# Patient Record
Sex: MALE | Race: WHITE | NOT HISPANIC OR LATINO | Employment: FULL TIME | URBAN - METROPOLITAN AREA
[De-identification: names, ages, dates, MRNs, and addresses within clinical notes are randomized per-mention and may not be internally consistent; named-entity substitution may affect disease eponyms.]

---

## 2024-05-30 ENCOUNTER — OFFICE VISIT (OUTPATIENT)
Dept: PRIMARY CARE | Facility: CLINIC | Age: 24
End: 2024-05-30
Payer: COMMERCIAL

## 2024-05-30 VITALS
HEART RATE: 88 BPM | BODY MASS INDEX: 27.11 KG/M2 | SYSTOLIC BLOOD PRESSURE: 170 MMHG | OXYGEN SATURATION: 98 % | HEIGHT: 63 IN | DIASTOLIC BLOOD PRESSURE: 90 MMHG | WEIGHT: 153 LBS | TEMPERATURE: 98.2 F

## 2024-05-30 DIAGNOSIS — Z00.00 ROUTINE GENERAL MEDICAL EXAMINATION AT A HEALTH CARE FACILITY: Primary | ICD-10-CM

## 2024-05-30 DIAGNOSIS — I10 PRIMARY HYPERTENSION: ICD-10-CM

## 2024-05-30 PROBLEM — F41.1 GENERALIZED ANXIETY DISORDER: Status: ACTIVE | Noted: 2024-05-30

## 2024-05-30 PROCEDURE — 3080F DIAST BP >= 90 MM HG: CPT | Performed by: FAMILY MEDICINE

## 2024-05-30 PROCEDURE — 99385 PREV VISIT NEW AGE 18-39: CPT | Performed by: FAMILY MEDICINE

## 2024-05-30 PROCEDURE — 99213 OFFICE O/P EST LOW 20 MIN: CPT | Performed by: FAMILY MEDICINE

## 2024-05-30 PROCEDURE — 3077F SYST BP >= 140 MM HG: CPT | Performed by: FAMILY MEDICINE

## 2024-05-30 RX ORDER — CETIRIZINE HYDROCHLORIDE 10 MG/1
10 TABLET ORAL DAILY
COMMUNITY

## 2024-05-30 RX ORDER — AMLODIPINE BESYLATE 5 MG/1
5 TABLET ORAL DAILY
Qty: 30 TABLET | Refills: 11 | Status: SHIPPED | OUTPATIENT
Start: 2024-05-30 | End: 2025-05-30

## 2024-05-30 ASSESSMENT — ENCOUNTER SYMPTOMS
LOSS OF SENSATION IN FEET: 0
OCCASIONAL FEELINGS OF UNSTEADINESS: 0
DEPRESSION: 0

## 2024-05-30 ASSESSMENT — PATIENT HEALTH QUESTIONNAIRE - PHQ9
SUM OF ALL RESPONSES TO PHQ9 QUESTIONS 1 AND 2: 0
1. LITTLE INTEREST OR PLEASURE IN DOING THINGS: NOT AT ALL
2. FEELING DOWN, DEPRESSED OR HOPELESS: NOT AT ALL

## 2024-05-30 NOTE — PATIENT INSTRUCTIONS
[facility]  Address: [address] [place] [address] [address], [place] [address]  Phone: [phone]    [facility]   Ananda Canada  [facility]  Mail Code CF10  [address] [place] [address]  Appointment:[phone]

## 2024-05-30 NOTE — PROGRESS NOTES
"Subjective   Patient ID: Marlo Epps is a 24 y.o. male who presents for Hypertension.    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 Flares up time to time.   Lately flared up about 1-2 months ago; took a break from lifting; the pain started going     Lifting hx: chest & triceps, back & biceps   No power lifting; more free weights   In the past had gone to [facility]   Stretches mostly legs     Blood pressrue   - [facility]   - [facility] was 140   - behind on sleep   - didn't work out   - caffeine: 1 cup per day   Diet: convenience foods meat, chicken, carbs, processed foods (chips, freezer discussed anticipatory guidance based on age.  He does have elevated blood pressure foods)   Takeout; chipotle   Pizza 1x/week   Nsaids: 1x/day     Objective   BP (!) 170/106 (BP Location: Left arm, Patient Position: Sitting, BP Cuff Size: Adult)   Pulse 88   Temp 36.8 °C (98.2 °F) (Skin)   Ht 1.6 m (5' 3\")   Wt 69.4 kg (153 lb)   SpO2 98%   BMI 27.10 kg/m²     Physical Exam  Constitutional:       General: He is not in acute distress.     Appearance: Normal appearance. He is not ill-appearing, toxic-appearing or diaphoretic.   HENT:      Head: Normocephalic and atraumatic.   Eyes:      Extraocular Movements: Extraocular movements intact.      Pupils: Pupils are equal, round, and reactive to light.   Cardiovascular:      Rate and Rhythm: Normal rate and regular rhythm.      Pulses: Normal pulses.      Heart sounds: Normal heart sounds.   Pulmonary:      Effort: Pulmonary effort is normal.      Breath sounds: Normal breath sounds.   Neurological:      Mental Status: He is alert.         Assessment/Plan   Diagnoses and all orders for this visit:  Routine general medical examination at a health care facility  Primary hypertension  -     " amLODIPine (Norvasc) 5 mg tablet; Take 1 tablet (5 mg) by mouth once daily.    Discussed anticipatory guidance for annual wellness exam based on his age.  He does have elevated blood pressure to 170/90.  We discussed risks and benefits of hypertension.  [facility] and BMP and CBC were within normal limits.  Will start amlodipine to help lower blood pressure follow-up in a couple months.    DO MEHRAN Viveros spent a total of 23 minutes on the date of the service which included preparing to see the patient, face-to-face patient care, completing clinical documentation, performing a medically appropriate examination, counseling and educating the patient/family/caregiver and ordering medications, tests, or procedures.

## 2024-06-03 ENCOUNTER — PATIENT MESSAGE (OUTPATIENT)
Dept: PRIMARY CARE | Facility: CLINIC | Age: 24
End: 2024-06-03
Payer: COMMERCIAL

## 2024-06-03 DIAGNOSIS — I10 PRIMARY HYPERTENSION: Primary | ICD-10-CM

## 2024-06-06 RX ORDER — VALSARTAN 80 MG/1
80 TABLET ORAL DAILY
Qty: 90 TABLET | Refills: 3 | Status: SHIPPED | OUTPATIENT
Start: 2024-06-06 | End: 2025-06-06

## 2024-06-10 ENCOUNTER — TELEPHONE (OUTPATIENT)
Dept: PRIMARY CARE | Facility: CLINIC | Age: 24
End: 2024-06-10
Payer: COMMERCIAL

## 2024-06-10 NOTE — TELEPHONE ENCOUNTER
Patient LM regarding his amlodipine. He is in [place] and wanted to know if he could wean off it or just stop.  We talked and I relayed message to him.  He is taking his BP while there and got a reading of 148/86 then phone cut out.

## 2024-06-27 ASSESSMENT — ENCOUNTER SYMPTOMS
ORTHOPNEA: 0
HEADACHES: 1
SHORTNESS OF BREATH: 0
PND: 0
PALPITATIONS: 0
HYPERTENSION: 1
BLURRED VISION: 0
NECK PAIN: 0
SWEATS: 0

## 2024-06-28 ENCOUNTER — APPOINTMENT (OUTPATIENT)
Dept: PRIMARY CARE | Facility: CLINIC | Age: 24
End: 2024-06-28
Payer: COMMERCIAL

## 2024-06-28 VITALS
BODY MASS INDEX: 25.93 KG/M2 | TEMPERATURE: 98 F | WEIGHT: 146.4 LBS | DIASTOLIC BLOOD PRESSURE: 78 MMHG | HEART RATE: 81 BPM | OXYGEN SATURATION: 99 % | SYSTOLIC BLOOD PRESSURE: 140 MMHG

## 2024-06-28 DIAGNOSIS — R00.2 PALPITATIONS: ICD-10-CM

## 2024-06-28 DIAGNOSIS — I10 PRIMARY HYPERTENSION: Primary | ICD-10-CM

## 2024-06-28 PROCEDURE — 3077F SYST BP >= 140 MM HG: CPT | Performed by: FAMILY MEDICINE

## 2024-06-28 PROCEDURE — 99214 OFFICE O/P EST MOD 30 MIN: CPT | Performed by: FAMILY MEDICINE

## 2024-06-28 PROCEDURE — 3078F DIAST BP <80 MM HG: CPT | Performed by: FAMILY MEDICINE

## 2024-06-28 NOTE — PROGRESS NOTES
Subjective   Patient ID: Marlo Epps is a 24 y.o. male who presents for Hypertension.    Answers submitted by the patient for this visit:  High Blood Pressure Questionnaire (Submitted on 6/27/2024)  Chief Complaint: Hypertension  Chronicity: new  Onset: 1 to 4 weeks ago  Progression since onset: rapidly improving  Condition status: controlled  anxiety: No  blurred vision: No  chest pain: No  headaches: Yes  malaise/fatigue: No  neck pain: No  orthopnea: No  palpitations: No  peripheral edema: No  PND: No  shortness of breath: No  sweats: No  Agents associated with hypertension: NSAIDs  CAD risks: smoking/tobacco exposure  Compliance problems: no compliance problems      Objective   /78 (BP Location: Left arm, Patient Position: Sitting, BP Cuff Size: Adult)   Pulse 81   Temp 36.7 °C (98 °F) (Skin)   Wt 66.4 kg (146 lb 6.4 oz)   SpO2 99%   BMI 25.93 kg/m²     Physical Exam  Constitutional:       General: He is not in acute distress.     Appearance: Normal appearance. He is not ill-appearing, toxic-appearing or diaphoretic.   HENT:      Head: Normocephalic and atraumatic.   Eyes:      Extraocular Movements: Extraocular movements intact.      Pupils: Pupils are equal, round, and reactive to light.   Cardiovascular:      Rate and Rhythm: Normal rate and regular rhythm.      Pulses: Normal pulses.      Heart sounds: Normal heart sounds.   Pulmonary:      Effort: Pulmonary effort is normal.      Breath sounds: Normal breath sounds.   Neurological:      Mental Status: He is alert.         Assessment/Plan   Diagnoses and all orders for this visit:  Primary hypertension  -     Transthoracic Echo (TTE) Complete; Future  Palpitations  -     Transthoracic Echo (TTE) Complete; Future    Continue on valsartan for htn     DO MEHRAN Viveros spent a total of 14 minutes on the date of the service which included preparing to see the patient, face-to-face patient care, completing clinical documentation, performing  a medically appropriate examination, counseling and educating the patient/family/caregiver and ordering medications, tests, or procedures.

## 2024-07-01 ENCOUNTER — PATIENT MESSAGE (OUTPATIENT)
Dept: PRIMARY CARE | Facility: CLINIC | Age: 24
End: 2024-07-01
Payer: COMMERCIAL

## 2024-07-01 DIAGNOSIS — I10 PRIMARY HYPERTENSION: Primary | ICD-10-CM

## 2024-07-12 ENCOUNTER — HOSPITAL ENCOUNTER (OUTPATIENT)
Dept: CARDIOLOGY | Facility: CLINIC | Age: 24
Discharge: HOME | End: 2024-07-12
Payer: COMMERCIAL

## 2024-07-12 DIAGNOSIS — R00.2 PALPITATIONS: ICD-10-CM

## 2024-07-12 DIAGNOSIS — I10 PRIMARY HYPERTENSION: ICD-10-CM

## 2024-07-12 LAB
AORTIC VALVE PEAK VELOCITY: 0.96 M/S
AV PEAK GRADIENT: 3.7 MMHG
AVA (PEAK VEL): 3.12 CM2
EJECTION FRACTION APICAL 4 CHAMBER: 65.6
EJECTION FRACTION: 63 %
LEFT ATRIUM VOLUME AREA LENGTH INDEX BSA: 19.7 ML/M2
LEFT VENTRICLE INTERNAL DIMENSION DIASTOLE: 4.38 CM (ref 3.5–6)
LEFT VENTRICULAR OUTFLOW TRACT DIAMETER: 1.98 CM
MITRAL VALVE E/A RATIO: 2
MITRAL VALVE E/E' RATIO: 7.31
RIGHT VENTRICLE FREE WALL PEAK S': 11 CM/S
RIGHT VENTRICLE PEAK SYSTOLIC PRESSURE: 21.1 MMHG
TRICUSPID ANNULAR PLANE SYSTOLIC EXCURSION: 1.6 CM

## 2024-07-12 PROCEDURE — 93306 TTE W/DOPPLER COMPLETE: CPT

## 2024-07-12 PROCEDURE — 93306 TTE W/DOPPLER COMPLETE: CPT | Performed by: INTERNAL MEDICINE

## 2024-08-16 ENCOUNTER — PATIENT MESSAGE (OUTPATIENT)
Dept: PRIMARY CARE | Facility: CLINIC | Age: 24
End: 2024-08-16
Payer: COMMERCIAL